# Patient Record
Sex: MALE | URBAN - METROPOLITAN AREA
[De-identification: names, ages, dates, MRNs, and addresses within clinical notes are randomized per-mention and may not be internally consistent; named-entity substitution may affect disease eponyms.]

---

## 2018-11-15 ENCOUNTER — APPOINTMENT (RX ONLY)
Dept: URBAN - METROPOLITAN AREA CLINIC 295 | Facility: CLINIC | Age: 38
Setting detail: DERMATOLOGY
End: 2018-11-15

## 2018-11-15 DIAGNOSIS — L21.8 OTHER SEBORRHEIC DERMATITIS: ICD-10-CM

## 2018-11-15 DIAGNOSIS — D485 NEOPLASM OF UNCERTAIN BEHAVIOR OF SKIN: ICD-10-CM

## 2018-11-15 DIAGNOSIS — D22 MELANOCYTIC NEVI: ICD-10-CM

## 2018-11-15 DIAGNOSIS — I78.8 OTHER DISEASES OF CAPILLARIES: ICD-10-CM

## 2018-11-15 PROBLEM — D48.5 NEOPLASM OF UNCERTAIN BEHAVIOR OF SKIN: Status: ACTIVE | Noted: 2018-11-15

## 2018-11-15 PROBLEM — L29.8 OTHER PRURITUS: Status: ACTIVE | Noted: 2018-11-15

## 2018-11-15 PROBLEM — L85.3 XEROSIS CUTIS: Status: ACTIVE | Noted: 2018-11-15

## 2018-11-15 PROBLEM — I10 ESSENTIAL (PRIMARY) HYPERTENSION: Status: ACTIVE | Noted: 2018-11-15

## 2018-11-15 PROBLEM — L70.0 ACNE VULGARIS: Status: ACTIVE | Noted: 2018-11-15

## 2018-11-15 PROBLEM — D22.72 MELANOCYTIC NEVI OF LEFT LOWER LIMB, INCLUDING HIP: Status: ACTIVE | Noted: 2018-11-15

## 2018-11-15 PROBLEM — D22.5 MELANOCYTIC NEVI OF TRUNK: Status: ACTIVE | Noted: 2018-11-15

## 2018-11-15 PROCEDURE — 99203 OFFICE O/P NEW LOW 30 MIN: CPT | Mod: 25

## 2018-11-15 PROCEDURE — ? COUNSELING

## 2018-11-15 PROCEDURE — ? BIOPSY BY SHAVE METHOD

## 2018-11-15 PROCEDURE — ? PRESCRIPTION

## 2018-11-15 PROCEDURE — 11100: CPT

## 2018-11-15 RX ORDER — KETOCONAZOLE 20.5 MG/ML
SHAMPOO, SUSPENSION TOPICAL
Qty: 1 | Refills: 12 | Status: ERX | COMMUNITY
Start: 2018-11-15

## 2018-11-15 RX ORDER — BETAMETHASONE VALERATE 1.2 MG/G
AEROSOL, FOAM TOPICAL
Qty: 1 | Refills: 12 | Status: ERX | COMMUNITY
Start: 2018-11-15

## 2018-11-15 RX ADMIN — BETAMETHASONE VALERATE: 1.2 AEROSOL, FOAM TOPICAL at 22:03

## 2018-11-15 RX ADMIN — KETOCONAZOLE: 20.5 SHAMPOO, SUSPENSION TOPICAL at 22:02

## 2018-11-15 ASSESSMENT — LOCATION SIMPLE DESCRIPTION DERM
LOCATION SIMPLE: LEFT CHEEK
LOCATION SIMPLE: POSTERIOR SCALP
LOCATION SIMPLE: UPPER BACK
LOCATION SIMPLE: LEFT ANKLE
LOCATION SIMPLE: RIGHT SCALP

## 2018-11-15 ASSESSMENT — LOCATION ZONE DERM
LOCATION ZONE: FACE
LOCATION ZONE: TRUNK
LOCATION ZONE: LEG
LOCATION ZONE: SCALP

## 2018-11-15 ASSESSMENT — LOCATION DETAILED DESCRIPTION DERM
LOCATION DETAILED: LEFT INFERIOR OCCIPITAL SCALP
LOCATION DETAILED: LEFT SUPERIOR CENTRAL MALAR CHEEK
LOCATION DETAILED: RIGHT MEDIAL FRONTAL SCALP
LOCATION DETAILED: INFERIOR THORACIC SPINE
LOCATION DETAILED: LEFT ANKLE

## 2018-11-15 NOTE — PROCEDURE: MIPS QUALITY
Detail Level: Detailed
Quality 402: Tobacco Use And Help With Quitting Among Adolescents: Patient screened for tobacco and is an ex-smoker
Quality 110: Preventive Care And Screening: Influenza Immunization: Influenza immunization was not ordered or administered, reason not given
Quality 431: Preventive Care And Screening: Unhealthy Alcohol Use - Screening: Patient screened for unhealthy alcohol use using a single question and scores less than 2 times per year
Quality 130: Documentation Of Current Medications In The Medical Record: Current Medications Documented

## 2018-11-15 NOTE — PROCEDURE: BIOPSY BY SHAVE METHOD
Biopsy Method: Dermablade
Bill 97536 For Specimen Handling/Conveyance To Laboratory?: no
Was A Bandage Applied: Yes
Cryotherapy Text: The wound bed was treated with cryotherapy after the biopsy was performed.
Electrodesiccation Text: The wound bed was treated with electrodesiccation after the biopsy was performed.
Size Of Lesion In Cm: 0
Dressing: bandage
Biopsy Type: H and E
Consent: Written consent was obtained and risks were reviewed including but not limited to scarring, infection, bleeding, scabbing, incomplete removal, nerve damage and allergy to anesthesia.
Type Of Destruction Used: Curettage
Lab: [id number]
Depth Of Biopsy: dermis
Hemostasis: Aluminum Chloride
Detail Level: Detailed
Electrodesiccation And Curettage Text: The wound bed was treated with electrodesiccation and curettage after the biopsy was performed.
Anesthesia Type: 2% lidocaine with epinephrine
Billing Type: Third-Party Bill
Anesthesia Volume In Cc (Will Not Render If 0): 0.5
Post-Care Instructions: I reviewed with the patient in detail post-care instructions. Patient is to keep the biopsy site dry overnight, and then apply bacitracin twice daily until healed. Patient may apply hydrogen peroxide soaks to remove any crusting.
Wound Care: Bacitracin
Notification Instructions: Patient will be notified of biopsy results. However, patient instructed to call the office if not contacted within 2 weeks.
Lab Facility: [id number]
Silver Nitrate Text: The wound bed was treated with silver nitrate after the biopsy was performed.

## 2018-11-26 ENCOUNTER — RX ONLY (OUTPATIENT)
Age: 38
Setting detail: RX ONLY
End: 2018-11-26

## 2018-11-26 RX ORDER — CLOBETASOL PROPIONATE 0.5 MG/G
AEROSOL, FOAM TOPICAL
Qty: 1 | Refills: 12 | Status: ERX | COMMUNITY
Start: 2018-11-26

## 2018-12-10 ENCOUNTER — RX ONLY (OUTPATIENT)
Age: 38
Setting detail: RX ONLY
End: 2018-12-10

## 2018-12-10 RX ORDER — CLOBETASOL PROPIONATE 0.5 MG/G
AEROSOL, FOAM TOPICAL
Qty: 1 | Refills: 12 | Status: ERX